# Patient Record
Sex: MALE | Race: BLACK OR AFRICAN AMERICAN | Employment: FULL TIME | URBAN - METROPOLITAN AREA
[De-identification: names, ages, dates, MRNs, and addresses within clinical notes are randomized per-mention and may not be internally consistent; named-entity substitution may affect disease eponyms.]

---

## 2020-02-09 ENCOUNTER — HOSPITAL ENCOUNTER (EMERGENCY)
Facility: HOSPITAL | Age: 30
Discharge: HOME | End: 2020-02-09
Attending: EMERGENCY MEDICINE
Payer: COMMERCIAL

## 2020-02-09 VITALS
SYSTOLIC BLOOD PRESSURE: 103 MMHG | OXYGEN SATURATION: 98 % | BODY MASS INDEX: 23.63 KG/M2 | DIASTOLIC BLOOD PRESSURE: 72 MMHG | WEIGHT: 147 LBS | RESPIRATION RATE: 18 BRPM | HEIGHT: 66 IN | HEART RATE: 84 BPM | TEMPERATURE: 98 F

## 2020-02-09 DIAGNOSIS — S61.012A LACERATION OF LEFT THUMB WITHOUT FOREIGN BODY WITHOUT DAMAGE TO NAIL, INITIAL ENCOUNTER: Primary | ICD-10-CM

## 2020-02-09 PROCEDURE — 90471 IMMUNIZATION ADMIN: CPT | Performed by: EMERGENCY MEDICINE

## 2020-02-09 PROCEDURE — 99283 EMERGENCY DEPT VISIT LOW MDM: CPT | Performed by: EMERGENCY MEDICINE

## 2020-02-09 PROCEDURE — 12002 RPR S/N/AX/GEN/TRNK2.6-7.5CM: CPT | Performed by: EMERGENCY MEDICINE

## 2020-02-09 PROCEDURE — 96372 THER/PROPH/DIAG INJ SC/IM: CPT | Performed by: EMERGENCY MEDICINE

## 2020-02-09 NOTE — ED INITIAL ASSESSMENT (HPI)
Patient cut his finger was with broken broom handle in the webbing between thumb and pointer finger of left hand. Patient has laceration wrapped with towel. Unknown when tetanus shot was.

## 2020-02-10 NOTE — ED PROVIDER NOTES
Patient Seen in: [facility] Emergency Department      History   Patient presents with:  Laceration Abrasion    Stated Complaint: laceration to left thumb while shoveling, shovel broke    HPI    Patient presents with a laceration to the left thumb. display       Laceration was anesthetized with lidocaine locally. The wound was cleansed and irrigated copiously. There was no visible foreign body within the wound.  The wound was closed using a simple closure with 5-0 nylon after an initial [id number]

## 2020-02-23 ENCOUNTER — HOSPITAL ENCOUNTER (OUTPATIENT)
Age: 30
Discharge: HOME | End: 2020-02-23
Attending: FAMILY MEDICINE
Payer: COMMERCIAL

## 2020-02-23 VITALS
DIASTOLIC BLOOD PRESSURE: 86 MMHG | WEIGHT: 150 LBS | SYSTOLIC BLOOD PRESSURE: 111 MMHG | RESPIRATION RATE: 16 BRPM | HEIGHT: 66 IN | OXYGEN SATURATION: 97 % | BODY MASS INDEX: 24.11 KG/M2 | HEART RATE: 75 BPM | TEMPERATURE: 98 F

## 2020-02-23 DIAGNOSIS — Z48.02 ENCOUNTER FOR REMOVAL OF SUTURES: Primary | ICD-10-CM

## 2020-02-23 NOTE — ED PROVIDER NOTES
Patient Seen in: [address]      History   Patient presents with:  Sut Stap Chayo    Stated Complaint: STITCHES REMOVAL    HPI    66-year-old right-hand-dominant male presents the IC secondary to suture removal.  Kristina some openings on the sides of the flap. Steri-Strips placed and wound dressed. Pt tolerated Procedure well. MDM     22-year-old male here for suture removal.  No complications.   Sutures removed and Steri-Strips placed to approximate the size of th

## 2020-03-15 NOTE — ED NOTES
Patient's girlfriend called and stated that the area where the sutures were removed \"looks funny\" and is concerned about wound healing. I recommended that the patient come in to have our MD assess the wound. She verbalized understanding.

## 2021-04-20 ENCOUNTER — LAB ENCOUNTER (OUTPATIENT)
Dept: LAB | Facility: HOSPITAL | Age: 31
End: 2021-04-20
Attending: ORTHOPAEDIC SURGERY
Payer: COMMERCIAL

## 2021-04-20 DIAGNOSIS — Z01.818 PREOPERATIVE TESTING: ICD-10-CM

## 2024-07-24 ENCOUNTER — OFFICE VISIT (OUTPATIENT)
Facility: CLINIC | Age: 34
End: 2024-07-24
Payer: COMMERCIAL

## 2024-07-24 VITALS
RESPIRATION RATE: 16 BRPM | HEIGHT: 66 IN | WEIGHT: 145 LBS | HEART RATE: 86 BPM | SYSTOLIC BLOOD PRESSURE: 104 MMHG | DIASTOLIC BLOOD PRESSURE: 68 MMHG | OXYGEN SATURATION: 97 % | BODY MASS INDEX: 23.3 KG/M2

## 2024-07-24 DIAGNOSIS — R06.83 SNORING: ICD-10-CM

## 2024-07-24 DIAGNOSIS — J30.89 NON-SEASONAL ALLERGIC RHINITIS DUE TO OTHER ALLERGIC TRIGGER: ICD-10-CM

## 2024-07-24 DIAGNOSIS — E10.9 TYPE 1 DIABETES MELLITUS WITHOUT COMPLICATION (HCC): Primary | ICD-10-CM

## 2024-07-24 PROBLEM — J30.9 ALLERGIC RHINITIS DUE TO ALLERGEN: Status: ACTIVE | Noted: 2024-07-24

## 2024-07-24 PROCEDURE — 99204 OFFICE O/P NEW MOD 45 MIN: CPT | Performed by: OTHER

## 2024-07-24 PROCEDURE — 3074F SYST BP LT 130 MM HG: CPT | Performed by: OTHER

## 2024-07-24 PROCEDURE — 3008F BODY MASS INDEX DOCD: CPT | Performed by: OTHER

## 2024-07-24 PROCEDURE — 3078F DIAST BP <80 MM HG: CPT | Performed by: OTHER

## 2024-07-24 RX ORDER — FLASH GLUCOSE SENSOR
1 KIT MISCELLANEOUS
COMMUNITY
Start: 2024-07-12

## 2024-07-24 NOTE — PROGRESS NOTES
[facility] PULMONARY  SLEEP PROGRESS NOTE        HPI:   This is a 33 year old male coming in for   Chief Complaint   Patient presents with    Consult     Sleep Consult / no sleep study  Snores       HPI:   Snoring bothersome to his bed partner  Retires to bed 12-1 , wakes at 645  Sleeps through the night  SL rapid  Occ nap during the day  Not very sleepy during the day  Full time work, 9-5 schedule    No dry mouth  Nasal breather  No headaches  No palpitations  No regular exercise recently  Wt stable    Malimpati class 4    Patient: Sleep review of systems today: see form.      Pt  PCP:  Sixto Pittman MD  No referring provider defined for this encounter.           No data to display                    Past Medical History:    Type 1 diabetes mellitus (HCC)     History reviewed. No pertinent surgical history.  Social History:  Social History     Social History Narrative    Not on file     Social History     Socioeconomic History    Marital status: Single   Tobacco Use    Smoking status: Every Day     Current packs/day: 0.50     Types: Cigarettes    Smokeless tobacco: Never   Vaping Use    Vaping status: Some Days   Substance and Sexual Activity    Alcohol use: Yes     Comment: couple of drink per week    Drug use: Not Currently     Family History:  History reviewed. No pertinent family history.  Allergies:  No Known Allergies  Current Meds:  Current Outpatient Medications   Medication Sig Dispense Refill    Continuous Glucose Sensor (FREESTYLE NERIS 14 DAY SENSOR) Does not apply Misc Apply 1 Device topically every 14 (fourteen) days. CHANGE SENSOR      Insulin Aspart Pen 100 UNIT/ML Subcutaneous Solution Pen-injector Inject into the skin.      insulin glargine 100 UNIT/ML Subcutaneous Solution Inject 16 Units into the skin nightly.        Ready to quit: Not Answered  Counseling given: Not Answered         Problem List:  Patient Active Problem List   Diagnosis    Type 1 diabetes mellitus without complication (HCC)     Snoring    Allergic rhinitis due to allergen       REVIEW OF SYSTEMS:   Review of Systems    EXAM:   /68 (BP Location: Right arm, Patient Position: Sitting, Cuff Size: adult)   Pulse 86   Resp 16   Ht 5' 6\" (1.676 m)   Wt 145 lb (65.8 kg)   SpO2 97%   BMI 23.40 kg/m²  Estimated body mass index is 23.4 kg/m² as calculated from the following:    Height as of this encounter: 5' 6\" (1.676 m).    Weight as of this encounter: 145 lb (65.8 kg).   Neck in inches:      Wt Readings from Last 6 Encounters:   07/24/24 145 lb (65.8 kg)   02/23/20 150 lb (68 kg)   02/09/20 147 lb (66.7 kg)     BP Readings from Last 3 Encounters:   07/24/24 104/68   02/23/20 111/86   02/09/20 103/72     Pulse Readings from Last 3 Encounters:   07/24/24 86   02/23/20 75   02/09/20 84     SpO2 Readings from Last 3 Encounters:   07/24/24 97%   02/23/20 97%   02/09/20 98%      Ideal body weight: 63.8 kg (140 lb 10.5 oz)  Adjusted ideal body weight: 64.6 kg (142 lb 6.3 oz)    Vital signs reviewed.  Physical Exam    ASSESSMENT AND PLAN:   1. Type 1 diabetes mellitus without complication (HCC)    2. Snoring  Malimpati 4 airway, snoring,     3. Non-seasonal allergic rhinitis due to other allergic trigger    There are no Patient Instructions on file for this visit.    Independent interpretation of Sleep Download as defined above.  Continue with Rx management of Sleep apnea with PAP therapy.    COMPLIANCE is required by insurance for 4 hours a night most nights of the week.    Advised if still with sleep apnea and not using CPAP has a 7 fold increase in risk of heart attack, stroke, abnormal heart rhythm  and death,  increased risk of driving accidents.     Advised to refrain from driving when sleepy.      Recommend weight loss, and maintain and optimal BMI with Exercise 30 minutes most days to target heart rate .     Advised patient to change filters,masks,hoses  and tubes and equiptment on a  regular schedule.    Filters and seals shall be  changed every 1 month,  Hoses every 3 months,   CPAP mask and humidifier chamber changed every 6 month  with the durable medical equipment provider.         Meds & Refills for this Visit:  Requested Prescriptions      No prescriptions requested or ordered in this encounter       Outcome: Parent verbalizes understanding. Parent is notified to call with any questions, complications, allergies, or worsening or changing symptoms.  Parent is to call with any side effects or complications from the treatments as a result of today.     \" This note was created utilizing Dragon speech recognition software.  Please excuse any grammatical errors. Call my office if you have any questions regarding this note. \"     Oscar Callahan,   7/24/2024  9:19 AM

## (undated) NOTE — ED AVS SNAPSHOT
Khoa Samaniego   MRN: [id number]    Department:  [facility] Emergency Department   Date of Visit:  2/9/2020           Disclosure     Insurance plans vary and the physician(s) referred by the ER may not be covered by your plan.  Please contact your i tell this physician (or your personal doctor if your instructions are to return to your personal doctor) about any new or lasting problems. The primary care or specialist physician will see patients referred from the [facility] Emergency Department.  Shelton Lombard